# Patient Record
(demographics unavailable — no encounter records)

---

## 2025-07-25 NOTE — HISTORY OF PRESENT ILLNESS
[Patient reported mammogram was normal] : Patient reported mammogram was normal [Patient reported breast sonogram was normal] : Patient reported breast sonogram was normal [Patient reported PAP Smear was normal] : Patient reported PAP Smear was normal [Patient reported colonoscopy was normal] : Patient reported colonoscopy was normal [postmenopausal] : postmenopausal [Y] : Patient is sexually active [N] : Patient denies prior pregnancies [No] : Patient does not have concerns regarding sex [Currently Active] : currently active [Mammogramdate] : 2025 [BreastSonogramDate] : 2025 [PapSmeardate] : 2019 [ColonoscopyDate] : 3 yrs ago [TextBox_43] : polyp and then repeat 5 yrs later [LMPDate] : @ 74 [FreeTextEntry1] : @ 54

## 2025-07-25 NOTE — HISTORY OF PRESENT ILLNESS
[Patient reported mammogram was normal] : Patient reported mammogram was normal [Patient reported breast sonogram was normal] : Patient reported breast sonogram was normal [Patient reported PAP Smear was normal] : Patient reported PAP Smear was normal [Patient reported colonoscopy was normal] : Patient reported colonoscopy was normal [postmenopausal] : postmenopausal [Y] : Patient is sexually active [N] : Patient denies prior pregnancies [No] : Patient does not have concerns regarding sex [Currently Active] : currently active [Mammogramdate] : 2025 [BreastSonogramDate] : 2025 [PapSmeardate] : 2019 [ColonoscopyDate] : 3 yrs ago [TextBox_43] : polyp and then repeat 5 yrs later [LMPDate] : @ 51 [FreeTextEntry1] : @ 54

## 2025-07-25 NOTE — PLAN
[FreeTextEntry1] : dypareunia-unable to perform pelvic due to pt discomfort and hymenal ring palpated referral to PT, vaginal dilators, estrogen cream blind pap done rtn 3 months for f/u

## 2025-07-25 NOTE — PHYSICAL EXAM
[Chaperoned Physical Exam] : A chaperone was present in the examining room during all aspects of the physical examination. [Appropriately responsive] : appropriately responsive [Alert] : alert [No Acute Distress] : no acute distress [No Lymphadenopathy] : no lymphadenopathy [No Murmurs] : no murmurs [Soft] : soft [Non-tender] : non-tender [Non-distended] : non-distended [No HSM] : No HSM [No Lesions] : no lesions [No Mass] : no mass [Oriented x3] : oriented x3 [Examination Of The Breasts] : a normal appearance [No Masses] : no breast masses were palpable [Labia Majora] : normal [Labia Minora] : normal [Normal] : normal [Uterine Adnexae] : normal [FreeTextEntry4] : hymenal ring palp; unable to place single digit without pt discomfort [FreeTextEntry5] : unable to vis/palp [FreeTextEntry6] : unable to palp